# Patient Record
Sex: FEMALE | Race: WHITE | NOT HISPANIC OR LATINO | URBAN - METROPOLITAN AREA
[De-identification: names, ages, dates, MRNs, and addresses within clinical notes are randomized per-mention and may not be internally consistent; named-entity substitution may affect disease eponyms.]

---

## 2024-01-29 ENCOUNTER — OFFICE VISIT (OUTPATIENT)
Dept: PEDIATRICS | Facility: CLINIC | Age: 19
End: 2024-01-29
Payer: COMMERCIAL

## 2024-01-29 ENCOUNTER — LAB (OUTPATIENT)
Dept: LAB | Facility: LAB | Age: 19
End: 2024-01-29
Payer: COMMERCIAL

## 2024-01-29 VITALS
BODY MASS INDEX: 23.54 KG/M2 | HEIGHT: 67 IN | WEIGHT: 150 LBS | DIASTOLIC BLOOD PRESSURE: 72 MMHG | SYSTOLIC BLOOD PRESSURE: 110 MMHG

## 2024-01-29 DIAGNOSIS — L30.8 OTHER ECZEMA: ICD-10-CM

## 2024-01-29 DIAGNOSIS — B07.0 PLANTAR WART: ICD-10-CM

## 2024-01-29 DIAGNOSIS — Z01.419 ENCOUNTER FOR WELL WOMAN EXAM WITH ROUTINE GYNECOLOGICAL EXAM: Primary | ICD-10-CM

## 2024-01-29 DIAGNOSIS — Z01.419 ENCOUNTER FOR WELL WOMAN EXAM WITH ROUTINE GYNECOLOGICAL EXAM: ICD-10-CM

## 2024-01-29 LAB
ERYTHROCYTE [DISTWIDTH] IN BLOOD BY AUTOMATED COUNT: 11.9 % (ref 11.5–14.5)
HCT VFR BLD AUTO: 42.1 % (ref 36–46)
HGB BLD-MCNC: 13.7 G/DL (ref 12–16)
MCH RBC QN AUTO: 29.2 PG (ref 26–34)
MCHC RBC AUTO-ENTMCNC: 32.5 G/DL (ref 32–36)
MCV RBC AUTO: 90 FL (ref 80–100)
NRBC BLD-RTO: 0 /100 WBCS (ref 0–0)
PLATELET # BLD AUTO: 267 X10*3/UL (ref 150–450)
RBC # BLD AUTO: 4.69 X10*6/UL (ref 4–5.2)
WBC # BLD AUTO: 5.7 X10*3/UL (ref 4.4–11.3)

## 2024-01-29 PROCEDURE — 36415 COLL VENOUS BLD VENIPUNCTURE: CPT

## 2024-01-29 PROCEDURE — 96127 BRIEF EMOTIONAL/BEHAV ASSMT: CPT | Performed by: PEDIATRICS

## 2024-01-29 PROCEDURE — 99395 PREV VISIT EST AGE 18-39: CPT | Performed by: PEDIATRICS

## 2024-01-29 PROCEDURE — 85027 COMPLETE CBC AUTOMATED: CPT

## 2024-01-29 PROCEDURE — 1036F TOBACCO NON-USER: CPT | Performed by: PEDIATRICS

## 2024-01-29 PROCEDURE — 82306 VITAMIN D 25 HYDROXY: CPT

## 2024-01-29 PROCEDURE — 99213 OFFICE O/P EST LOW 20 MIN: CPT | Performed by: PEDIATRICS

## 2024-01-29 RX ORDER — DESONIDE 0.5 MG/G
OINTMENT TOPICAL
Qty: 60 G | Refills: 0 | Status: SHIPPED | OUTPATIENT
Start: 2024-01-29

## 2024-01-29 RX ORDER — DESOGESTREL AND ETHINYL ESTRADIOL 0.15-0.03
1 KIT ORAL DAILY
COMMUNITY

## 2024-01-29 ASSESSMENT — ENCOUNTER SYMPTOMS
GASTROINTESTINAL NEGATIVE: 1
PSYCHIATRIC NEGATIVE: 1
MUSCULOSKELETAL NEGATIVE: 1
HEMATOLOGIC/LYMPHATIC NEGATIVE: 1
CARDIOVASCULAR NEGATIVE: 1
CONSTITUTIONAL NEGATIVE: 1
NEUROLOGICAL NEGATIVE: 1
EYES NEGATIVE: 1
RESPIRATORY NEGATIVE: 1
ENDOCRINE NEGATIVE: 1
ALLERGIC/IMMUNOLOGIC NEGATIVE: 1

## 2024-01-29 NOTE — PROGRESS NOTES
Subjective   Patient ID: Jenni Salazar is a 19 y.o. female who presents for Well Child.  Subjective  Jenni is a 19 y.o. female who presents today  for her Health Maintenance and Supervision Exam.    General Health:  Jenni is overall in good health.  Concerns today: yes - dry red itchy pathes on arms  Some thick skin over index finger.     Social and Family History:  At home, there have been no interval changes. Lives with dad when she is home. Will visit with her mum. 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 Will live in an apt there with a roommate.   Parental support, work/family balance? Yes    Nutrition:  Balanced diet? Yes  Calcium source? Yes    Dental Care:  Jenni has a dental home? Yes  Dental hygiene regularly performed? Yes  Fluoridate water: No    Elimination:  Elimination patterns appropriate: Yes    Sleep:  Sleep patterns appropriate? Yes  Sleep problems: No     Behavior/Socialization:  Good relationships with parents and siblings? Yes  Supportive adult relationship? Yes  Permitted to make decisions? Yes  Responsibilities and chores? Yes  Family Meals? Yes  Normal peer relationships? Yes    Development/Education:  Age Appropriate: Yes    Jenni is in a dance program  Socially well adjusted? Yes      Activities:  Physical Activity: Yes  Limited screen/media use: Yes  Extracurricular Activities/Hobbies/Interests: Yes  Likes to dance    Sports Participation Screening:  Pre-sports participation survey questions assessed and passed? No  Have you ever had a concussion: No  Have you ever fainted or nearly fainted during or after exercise: No   Do you have chest pain during exercise: No  Do you get short of breath more than others during exercise: No  Have you ever had any palpitations, rapid or skipped heart beats at rest or with exercise No  Do you have any heart problems: No  Do you know of any family member that had a heart attack or  without a cause prior  to 50 years of age No    Menstrual Status:  Regular cycle intervals: Yes    Sexual History:  Dating? Yes  Sexually Active? Yes--Uses Contraception: uses barrier contraception inconsistently    Drugs:  Tobacco? No  Uses drugs? none  Alcohol No    Mental Health:  Depression Screening: at risk  Thoughts of self harm/suicide? No  Depression screening tool used: PHQ-A/PHQ- 9         Safety Assessment:  Seatbelt: yes    Drives with texting/talking: no  Sun safety: yes    Second hand smoke: no  Internet Safety: yes  Nonviolent peer relationships: yes   Nonviolent environment: yes     Safety topics reviewed: Yes            Review of Systems   Constitutional: Negative.    HENT: Negative.     Eyes: Negative.    Respiratory: Negative.     Cardiovascular: Negative.    Gastrointestinal: Negative.    Endocrine: Negative.    Genitourinary: Negative.    Musculoskeletal: Negative.    Skin: Negative.    Allergic/Immunologic: Negative.    Neurological: Negative.    Hematological: Negative.    Psychiatric/Behavioral: Negative.         Objective   Physical Exam  Vitals and nursing note reviewed. Exam conducted with a chaperone present.   Constitutional:       Appearance: Normal appearance.   HENT:      Head: Normocephalic and atraumatic.      Right Ear: Tympanic membrane, ear canal and external ear normal.      Left Ear: Tympanic membrane, ear canal and external ear normal.      Nose: Nose normal.      Mouth/Throat:      Mouth: Mucous membranes are moist.   Eyes:      Extraocular Movements: Extraocular movements intact.      Conjunctiva/sclera: Conjunctivae normal.      Pupils: Pupils are equal, round, and reactive to light.   Cardiovascular:      Rate and Rhythm: Normal rate and regular rhythm.      Heart sounds: Normal heart sounds.   Pulmonary:      Effort: Pulmonary effort is normal.      Breath sounds: Normal breath sounds.   Abdominal:      General: Abdomen is flat. Bowel sounds are normal.      Palpations: Abdomen is soft.    Musculoskeletal:         General: Normal range of motion.      Cervical back: Normal range of motion and neck supple.   Skin:     General: Skin is warm and dry.      Comments: Braden dry areas over flexor aspects of elbows and some dry braden area in both axillae  Left index finer at nails edge 3 small warts   Neurological:      General: No focal deficit present.      Mental Status: She is alert and oriented to person, place, and time.   Psychiatric:         Mood and Affect: Mood normal.         Behavior: Behavior normal.         Assessment/Plan   Diagnoses and all orders for this visit:  Encounter for well woman exam with routine gynecological exam  -     CBC; Future  -     Vitamin D 25-Hydroxy,Total (for eval of Vitamin D levels); Future  Anticipatory guidance discussed.   Eating healthy foods and a varied diet   regular exercise 60 minutes daily    a good sleep schedule with 8 hours of sleep per night    Importance of regular dental care   Limit TV and total screen time, discussed media violence   Safety with activities    Discussed the negative effects of smoking, vaping, and alcohol use   Discussed safe sex practices, STD and pregnancy prevention   Importance of breast/testicular self exam   Other eczema  -     desonide (DesOwen) 0.05 % ointment; Apply to affected area twice daily for up to 7 days. She will use moisturizing cream twice daily.   Warts beneath nail  For her warts she will use OTC topical medicine If they are stubborn and persistent she will se a dermatologist   Follow-up visit in 1 year for next well child visit, or sooner as needed.       Blessing Martinez MD 01/29/24 11:37 AM

## 2024-01-30 LAB — 25(OH)D3 SERPL-MCNC: 27 NG/ML (ref 30–100)

## 2025-05-14 PROCEDURE — 88175 CYTOPATH C/V AUTO FLUID REDO: CPT

## 2025-05-15 ENCOUNTER — LAB REQUISITION (OUTPATIENT)
Dept: LAB | Facility: HOSPITAL | Age: 20
End: 2025-05-15
Payer: COMMERCIAL

## 2025-05-15 DIAGNOSIS — Z01.419 ENCOUNTER FOR GYNECOLOGICAL EXAMINATION (GENERAL) (ROUTINE) WITHOUT ABNORMAL FINDINGS: ICD-10-CM

## 2025-05-15 DIAGNOSIS — Z12.4 ENCOUNTER FOR SCREENING FOR MALIGNANT NEOPLASM OF CERVIX: ICD-10-CM

## 2025-05-15 DIAGNOSIS — Z11.51 ENCOUNTER FOR SCREENING FOR HUMAN PAPILLOMAVIRUS (HPV): ICD-10-CM

## 2025-06-08 LAB
CYTOLOGY CMNT CVX/VAG CYTO-IMP: NORMAL
LAB AP HPV GENOTYPE QUESTION: YES
LAB AP HPV HR: NORMAL
LABORATORY COMMENT REPORT: NORMAL
LMP START DATE: NORMAL
PATH REPORT.TOTAL CANCER: NORMAL